# Patient Record
(demographics unavailable — no encounter records)

---

## 2024-12-03 NOTE — HISTORY OF PRESENT ILLNESS
[FreeTextEntry1] : Patient presents for BPH, HLD, HTN follow up and medication refills  [de-identified] : SEAMUS CAMPOS is a 54-year male with type II diabetes, hypothyroidism, hypertension, hyperlipidemia who presents today Dec 03, 2024 follow up.   He missed his endocrinologist appt back in September and has not rescheduled.  He has not booked his colonoscopy. He never had colonoscopy, and family history of rectal cancer  In July his TSH was 0.23 and T4 2.0. He was advised to take 1/2 pill on Sundays, but forgot and has been taking 175mcg QD.  Last diabetic eye exam 2 years ago.  He started seeing [facility] Dr. Ng for BPH and cystitis but practice no longer accepts insurance.   Patient denies any lightheadedness, dizziness, chest pain, palpitations, shortness or breath, or lower leg edema.

## 2024-12-03 NOTE — ASSESSMENT
[FreeTextEntry1] : SEAMUS CAMPOS is a 54-year male with type II diabetes, hypothyroidism, hypertension, hyperlipidemia who presents today Dec 03, 2024 for the following issues    Type II diabetes July A1c 6.4% Repeat A1c today Needs diabetic eye exam Continue medications as per endo  Hypothyroidism  -  July  TSH 0.23 and T4 2.0 - Repeat TFTs today and titrate levothyroxine accordingly as per endocrinology recommendations   HTN - Continue Olmesartan 5mg QD  HLD - Continue Atorvastatin  20mg   BPH/Cystitis - Will refer to [facility]   Follow up in 3 months

## 2024-12-26 NOTE — HISTORY OF PRESENT ILLNESS
[FreeTextEntry1] : 53 yo patient with a pertinent past medical history of gout, HTN, kidney stones, DM2 who presents to clinic today with complaints of a recent UTI x 1 time, also has ED. new gf since august - he says it is difficult to maintain an erection. he also does have a history of kidney stones. has ah hx of ESWL x 1 eight years ago.   Bowel Movements are regular. Denies history of colonic diverticulum/diverticulitis.   No  surgical Hx including Uroflow, UDS, Cystoscopy. Denies past UTI Hx, denies fever, chills, sweats, flank pain.  Denies history of kidney stones or bladder stones.  No Neurologic Hx.  No Hx of DM  ex-smoker for 16 years IPSS: 3 /BRIDGETTE =7 US KB: PS=22cc / PVR = 4 mL // Left kidney: 1 cm x 0.9 cm upper pole kidney stone PSA=0.59

## 2024-12-26 NOTE — ASSESSMENT
[FreeTextEntry1] : plan tadalafil 5 mg QD CT [facility] [place] All images and labs were reviewed and explained thoroughly All the patient's questions were answered to the best of my ability.

## 2025-01-09 NOTE — HISTORY OF PRESENT ILLNESS
[FreeTextEntry1] : 55 yo patient with a pertinent past medical history of gout, HTN, kidney stones, DM2 who presents to clinic today with complaints of a recent UTI x 1 time, also has ED. new gf since august - he says it is difficult to maintain an erection. he also does have a history of kidney stones. has ah hx of ESWL x 1 eight years ago.   Bowel Movements are regular. Denies history of colonic diverticulum/diverticulitis.   ex-smoker for 16 years IPSS: 3 /BRIDGETTE =7 US KB: PS=22cc / PVR = 4 mL // Left kidney: 1 cm x 0.9 cm upper pole kidney stone PSA=0.59 Jan 2025: CT stone hunt: / sx on tadalafil 5 mg: